# Patient Record
Sex: FEMALE | Employment: STUDENT | URBAN - METROPOLITAN AREA
[De-identification: names, ages, dates, MRNs, and addresses within clinical notes are randomized per-mention and may not be internally consistent; named-entity substitution may affect disease eponyms.]

---

## 2017-01-18 ENCOUNTER — TRANSFERRED RECORDS (OUTPATIENT)
Dept: HEALTH INFORMATION MANAGEMENT | Facility: CLINIC | Age: 22
End: 2017-01-18

## 2018-12-07 ENCOUNTER — OFFICE VISIT (OUTPATIENT)
Dept: DERMATOLOGY | Facility: CLINIC | Age: 23
End: 2018-12-07
Payer: COMMERCIAL

## 2018-12-07 DIAGNOSIS — D22.9 MULTIPLE BENIGN NEVI: ICD-10-CM

## 2018-12-07 DIAGNOSIS — R61 HYPERHIDROSIS: ICD-10-CM

## 2018-12-07 DIAGNOSIS — D48.9 NEOPLASM OF UNCERTAIN BEHAVIOR: Primary | ICD-10-CM

## 2018-12-07 ASSESSMENT — PAIN SCALES - GENERAL: PAINLEVEL: NO PAIN (0)

## 2018-12-07 NOTE — LETTER
Date:December 10, 2018      Patient was self referred, no letter generated. Do not send.        [facility] Health Information

## 2018-12-07 NOTE — LETTER
12/7/2018       RE: Michelle Tapia  [address] [place] [id number]     Dear Colleague,    Thank you for referring your patient, Michelle Tapia, to the [facility] DERMATOLOGY at [facility]. Please see a copy of my visit note below.    [facility] Dermatology Note      Dermatology Problem List:  1. Axillary hyperhidrosis.  - Previously failed topicals and pills (presumably glycopyrrolate) at outside clinic, then on Botox yearly since high school  - Botox referral once we check insurance  2. Lesion to monitor:  - L lower back, 9 mm medium brown nevus, photo 12/7/2018  # NUB:  - L knee, bx 12/7/2018     Encounter Date: Dec 7, 2018    CC:   Chief Complaint   Patient presents with     Derm Problem     michelle is here for Hyperhydrosis & mole check.         History of Present Illness:  Ms. Michelle Tapia is a 23 year old female who presents for evaluation of skin check and axillary hyperhidrosis.  Patient has a long-standing history of axillary hyperhidrosis.  She previously tried wipes and pills, presumably glycopyrrolate, through other clinics.  She has then been getting Botox since high school.  She usually needs to get it about once a year.  Her symptoms are worse in the winter and she would like to get this procedure soon. She last had it down a little over 1 year ago.    She also would like a general skin check.  She notes one lesion on her left knee which has recently been scabbing and bleeding.  It has been present since birth but the scabbing and bleeding is new.  She also notes a newer mole about 6 months ago on her back that is a little bigger than her other moles. It might be getting a little bigger but she is not sure.  She has a history of mole removal at the age of 2 and is not sure on the pathology. No other personal history of mole removal or skin cancer.  Otherwise feeling well, in usual state of health.  No additional skin concerns.   Does have a history of tanning bed use before prom in high school.    Past Medical History:     Past Medical History:   Diagnosis Date     Hyperhidrosis      Social History:  Patient reports that she has never smoked. She has never used smokeless tobacco.    at the [employer].  She is getting her masters degree in .  + history of tanning bed use in high school.    Family History:  Family History   Problem Relation Age of Onset     Skin Cancer No family hx of        Medications:  No current outpatient prescriptions on file.        Allergies   Allergen Reactions     Furoxone [Furazolidone] Hives         Review of Systems:  -Constitutional: Otherwise feeling well today, in usual state of health.  -Skin: As above in HPI. No additional skin concerns.    Physical exam:  GEN: This is a well developed, well-nourished female in no acute distress, in a pleasant mood.    SKIN: Total skin excluding the undergarment areas was performed. The exam included the head/face, neck, both arms, chest, back, abdomen, both legs, digits and/or nails.   - Lesion of concern on left knee is a 5 mm greyish blue macule with rim of hypopigmentation on lateral aspect, total size of 8 mm. Dermoscopy with asymmetric pigment.  - Lesion of concern on left lower back is a ovoid 9 mm medium brown macule with symmetric reticulated pigment.  -Scattered also on the trunk extremities, there are multiple light to medium brown macules with reassuring appearance.  Pigment network often reticulated.  - No other lesions of concern on areas examined.              Impression/Plan:  1. Neoplasm of uncertain behavior on the left knee. The differential diagnosis includes dysplastic nevus v traumatized nevus v melanoma. Will perform bx for clarification.     Shave biopsy:  After discussion of benefits and risks including but not limited to bleeding/bruising, pain/swelling, infection, scar, incomplete removal, nerve  damage/numbness, recurrence, and non-diagnostic biopsy, written consent, verbal consent and photographs were obtained. Time-out was performed. The area was cleaned with isopropyl alcohol and a total 2.5ml of 1% lidocaine was injected to obtain adequate anesthesia. A shave biopsy was performed. Hemostasis was achieved with aluminium chloride. Vaseline and a sterile dressing were applied. The patient tolerated the procedure and no complications were noted. The patient was provided with verbal and written post care instructions.    2. Multiple clinically benign nevi on the trunk and extremities.    ABCDs of melanoma were discussed and self skin checks were advised.    Sun precaution was advised including the use of sun screens of SPF 30 or higher, sun protective clothing, and avoidance of tanning beds,    3. Lesion to monitor, left lower back. Nevus on left lower back with reassuring appearance but given size and recent development, will take photo today and monitor for changes.     Photodocumentation today.    Patient will call if notes changes sooner.    4. Axillary hyperhidrosis.  Has previously failed topicals and pills, presumably glycopyrrolate, and has been on Botox for many years.  She is due for her next injection and would like to schedule this.  Last injection was over 1 year ago.    Check botox with insurance.    Schedule for botox.    Follow-up in for botox when able to be scheduled then in 1 year for skin check, earlier for new or changing lesions or pending biopsy results.      Staff Physician Comments:  I saw and evaluated the patient with the resident and I agree with the assessment and plan as documented in the resident's note.    Nikunj Arriaga MD  Professor  Head of Dermato-Allergy Division  Department of Dermatology  [facility], [place], [place]      Staff Involved:  Resident/Staff    Again, thank you for allowing me to participate in the care of your patient.       Sincerely,    Nikunj Arriaga MD

## 2018-12-07 NOTE — MR AVS SNAPSHOT
After Visit Summary   12/7/2018    Michelle Tapia    MRN: [id number]           Patient Information     Date Of Birth          1995        Visit Information        Provider Department      12/7/2018 10:00 AM Nikunj Arriaga MD [facility] Dermatology        Today's Diagnoses     Neoplasm of uncertain behavior    -  1    Multiple benign nevi        Hyperhidrosis          Care Instructions    We will check your mole today and let you know the results.  We will also run the botox through your insurance to make sure it's well-covered and then we will call to get you scheduled.    Return in 1 year for your skin check.  Sooner for the botox and if any skin changes.        Wound Care After a Biopsy    What is a skin biopsy?  A skin biopsy allows the doctor to examine a very small piece of tissue under the microscope to determine the diagnosis and the best treatment for the skin condition. A local anesthetic (numbing medicine)  is injected with a very small needle into the skin area to be tested. A small piece of skin is taken from the area. Sometimes a suture (stitch) is used.     What are the risks of a skin biopsy?  I will experience scar, bleeding, swelling, pain, crusting and redness. I may experience incomplete removal or recurrence. Risks of this procedure are excessive bleeding, bruising, infection, nerve damage, numbness, thick (hypertrophic or keloidal) scar and non-diagnostic biopsy.    How should I care for my wound for the first 24 hours?    Keep the wound dry and covered for 24 hours    If it bleeds, hold direct pressure on the area for 15 minutes. If bleeding does not stop then go to the emergency room    Avoid strenuous exercise the first 1-2 days or as your doctor instructs you    How should I care for the wound after 24 hours?    After 24 hours, remove the bandage    You may bathe or shower as normal    If you had a scalp biopsy, you can shampoo as usual and can use shower water to  clean the biopsy site daily    Clean the wound twice a day with gentle soap and water    Do not scrub, be gentle    Apply white petroleum/Vaseline after cleaning the wound with a cotton swab or a clean finger, and keep the site covered with a Bandaid /bandage. Bandages are not necessary with a scalp biopsy    If you are unable to cover the site with a Bandaid /bandage, re-apply ointment 2-3 times a day to keep the site moist. Moisture will help with healing    Avoid strenuous activity for first 1-2 days    Avoid lakes, rivers, pools, and oceans until the stitches are removed or the site is healed    How do I clean my wound?    Wash hands thoroughly with soap or use hand  before all wound care    Clean the wound with gentle soap and water    Apply white petroleum/Vaseline  to wound after it is clean    Replace the Bandaid /bandage to keep the wound covered for the first few days or as instructed by your doctor    If you had a scalp biopsy, warm shower water to the area on a daily basis should suffice    What should I use to clean my wound?     Cotton-tipped applicators (Qtips )    White petroleum jelly (Vaseline ). Use a clean new container and use Q-tips to apply.    Bandaids   as needed    Gentle soap     How should I care for my wound long term?    Do not get your wound dirty    Keep up with wound care for one week or until the area is healed.    A small scab will form and fall off by itself when the area is completely healed. The area will be red and will become pink in color as it heals. Sun protection is very important for how your scar will turn out. Sunscreen with an SPF 30 or greater is recommended once the area is healed.    If you have stitches, stitches need to be removed in 14 days. You may return to our clinic for this or you may have it done locally at your doctor s office.    You should have some soreness but it should be mild and slowly go away over several days. Talk to your doctor about  using tylenol for pain,    When should I call my doctor?  If you have increased:     Pain or swelling    Pus or drainage (clear or slightly yellow drainage is ok)    Temperature over 100F    Spreading redness or warmth around wound    When will I hear about my results?  The biopsy results can take 2-3 weeks to come back. The clinic will call you with the results, send you a [employer] message, or have you schedule a follow-up clinic or phone time to discuss the results. Contact our clinics if you do not hear from us in 3 weeks.     Who should I call with questions?    [facility]: [phone]     [facility]: [phone]    For urgent needs outside of business hours call the [facility] at [phone] and ask for the dermatology resident on call    The ABCDEs of Melanoma    Skin cancer can develop anywhere on the skin. Ask someone for help when checking your skin, especially in hard to see places. If you notice a mole different from others, or that changes, enlarges, itches, or bleeds (even if it is small), you should see a dermatologist.                  Follow-ups after your visit        Follow-up notes from your care team     Return in about 1 year (around 12/7/2019).      Who to contact     Please call your clinic at [phone] to:    Ask questions about your health    Make or cancel appointments    Discuss your medicines    Learn about your test results    Speak to your doctor            Additional Information About Your Visit        MyChart Information     easy2comply ([employer]) is an electronic gateway that provides easy, online access to your medical records. With easy[employer]comply ([employer]), you can request a clinic appointment, read your test results, renew a prescription or communicate with your care team.     To sign up for [employer] visit the website at [email or web address]   You will be asked to enter the access code listed below, as well as some personal  information. Please follow the directions to create your username and password.     Your access code is: [email or web address]  Expires: 3/6/2019  6:30 AM     Your access code will  in 90 days. If you need help or a new code, please contact your [facility] or call [phone] for assistance.        Care EveryWhere ID     This is your Care EveryWhere ID. This could be used by other organizations to access your [place] medical records  [id number]         Blood Pressure from Last 3 Encounters:   No data found for BP    Weight from Last 3 Encounters:   No data found for Wt              We Performed the Following     Dermatological path order and indications        Primary Care Provider    None Specified       No primary provider on file.        Equal Access to Services     [facility] : Andrea Faith, joel archer, eric heck, edgar martin . So [facility] [phone].    ATENCIÓN: Si habla español, tiene a george disposición servicios gratuitos de asistencia lingüística. Llame al [phone].    We comply with applicable federal civil rights laws and [place] laws. We do not discriminate on the basis of race, color, national origin, age, disability, sex, sexual orientation, or gender identity.            Thank you!     Thank you for choosing [facility] DERMATOLOGY  for your care. Our goal is always to provide you with excellent care. Hearing back from our patients is one way we can continue to improve our services. Please take a few minutes to complete the written survey that you may receive in the mail after your visit with us. Thank you!             Your Updated Medication List - Protect others around you: Learn how to safely use, store and throw away your medicines at www.disposemymeds.org.      Notice  As of 2018 10:52 AM    You have not been prescribed any medications.

## 2018-12-07 NOTE — PATIENT INSTRUCTIONS
We will check your mole today and let you know the results.  We will also run the botox through your insurance to make sure it's well-covered and then we will call to get you scheduled.    Return in 1 year for your skin check.  Sooner for the botox and if any skin changes.        Wound Care After a Biopsy    What is a skin biopsy?  A skin biopsy allows the doctor to examine a very small piece of tissue under the microscope to determine the diagnosis and the best treatment for the skin condition. A local anesthetic (numbing medicine)  is injected with a very small needle into the skin area to be tested. A small piece of skin is taken from the area. Sometimes a suture (stitch) is used.     What are the risks of a skin biopsy?  I will experience scar, bleeding, swelling, pain, crusting and redness. I may experience incomplete removal or recurrence. Risks of this procedure are excessive bleeding, bruising, infection, nerve damage, numbness, thick (hypertrophic or keloidal) scar and non-diagnostic biopsy.    How should I care for my wound for the first 24 hours?    Keep the wound dry and covered for 24 hours    If it bleeds, hold direct pressure on the area for 15 minutes. If bleeding does not stop then go to the emergency room    Avoid strenuous exercise the first 1-2 days or as your doctor instructs you    How should I care for the wound after 24 hours?    After 24 hours, remove the bandage    You may bathe or shower as normal    If you had a scalp biopsy, you can shampoo as usual and can use shower water to clean the biopsy site daily    Clean the wound twice a day with gentle soap and water    Do not scrub, be gentle    Apply white petroleum/Vaseline after cleaning the wound with a cotton swab or a clean finger, and keep the site covered with a Bandaid /bandage. Bandages are not necessary with a scalp biopsy    If you are unable to cover the site with a Bandaid /bandage, re-apply ointment 2-3 times a day to keep the  site moist. Moisture will help with healing    Avoid strenuous activity for first 1-2 days    Avoid lakes, rivers, pools, and oceans until the stitches are removed or the site is healed    How do I clean my wound?    Wash hands thoroughly with soap or use hand  before all wound care    Clean the wound with gentle soap and water    Apply white petroleum/Vaseline  to wound after it is clean    Replace the Bandaid /bandage to keep the wound covered for the first few days or as instructed by your doctor    If you had a scalp biopsy, warm shower water to the area on a daily basis should suffice    What should I use to clean my wound?     Cotton-tipped applicators (Qtips )    White petroleum jelly (Vaseline ). Use a clean new container and use Q-tips to apply.    Bandaids   as needed    Gentle soap     How should I care for my wound long term?    Do not get your wound dirty    Keep up with wound care for one week or until the area is healed.    A small scab will form and fall off by itself when the area is completely healed. The area will be red and will become pink in color as it heals. Sun protection is very important for how your scar will turn out. Sunscreen with an SPF 30 or greater is recommended once the area is healed.    If you have stitches, stitches need to be removed in 14 days. You may return to our clinic for this or you may have it done locally at your doctor s office.    You should have some soreness but it should be mild and slowly go away over several days. Talk to your doctor about using tylenol for pain,    When should I call my doctor?  If you have increased:     Pain or swelling    Pus or drainage (clear or slightly yellow drainage is ok)    Temperature over 100F    Spreading redness or warmth around wound    When will I hear about my results?  The biopsy results can take 2-3 weeks to come back. The clinic will call you with the results, send you a [employer] message, or have you schedule a  follow-up clinic or phone time to discuss the results. Contact our clinics if you do not hear from us in 3 weeks.     Who should I call with questions?    [facility]: [phone]     [facility]: [phone]    For urgent needs outside of business hours call the [facility] at [phone] and ask for the dermatology resident on call    The ABCDEs of Melanoma    Skin cancer can develop anywhere on the skin. Ask someone for help when checking your skin, especially in hard to see places. If you notice a mole different from others, or that changes, enlarges, itches, or bleeds (even if it is small), you should see a dermatologist.

## 2018-12-07 NOTE — PROGRESS NOTES
[facility] Dermatology Note      Dermatology Problem List:  1. Axillary hyperhidrosis.  - Previously failed topicals and pills (presumably glycopyrrolate) at outside clinic, then on Botox yearly since high school  - Botox referral once we check insurance  2. Lesion to monitor:  - L lower back, 9 mm medium brown nevus, photo 12/7/2018  # NUB:  - L knee, bx 12/7/2018     Encounter Date: Dec 7, 2018    CC:   Chief Complaint   Patient presents with     Derm Problem     michelle is here for Hyperhydrosis & mole check.         History of Present Illness:  Ms. Michelle Tapia is a 23 year old female who presents for evaluation of skin check and axillary hyperhidrosis.  Patient has a long-standing history of axillary hyperhidrosis.  She previously tried wipes and pills, presumably glycopyrrolate, through other clinics.  She has then been getting Botox since high school.  She usually needs to get it about once a year.  Her symptoms are worse in the winter and she would like to get this procedure soon. She last had it down a little over 1 year ago.    She also would like a general skin check.  She notes one lesion on her left knee which has recently been scabbing and bleeding.  It has been present since birth but the scabbing and bleeding is new.  She also notes a newer mole about 6 months ago on her back that is a little bigger than her other moles. It might be getting a little bigger but she is not sure.  She has a history of mole removal at the age of 2 and is not sure on the pathology. No other personal history of mole removal or skin cancer.  Otherwise feeling well, in usual state of health.  No additional skin concerns.  Does have a history of tanning bed use before prom in high school.    Past Medical History:     Past Medical History:   Diagnosis Date     Hyperhidrosis      Social History:  Patient reports that she has never smoked. She has never used smokeless tobacco.    at  the [employer].  She is getting her masters degree in .  + history of tanning bed use in high school.    Family History:  Family History   Problem Relation Age of Onset     Skin Cancer No family hx of        Medications:  No current outpatient prescriptions on file.        Allergies   Allergen Reactions     Furoxone [Furazolidone] Hives         Review of Systems:  -Constitutional: Otherwise feeling well today, in usual state of health.  -Skin: As above in HPI. No additional skin concerns.    Physical exam:  GEN: This is a well developed, well-nourished female in no acute distress, in a pleasant mood.    SKIN: Total skin excluding the undergarment areas was performed. The exam included the head/face, neck, both arms, chest, back, abdomen, both legs, digits and/or nails.   - Lesion of concern on left knee is a 5 mm greyish blue macule with rim of hypopigmentation on lateral aspect, total size of 8 mm. Dermoscopy with asymmetric pigment.  - Lesion of concern on left lower back is a ovoid 9 mm medium brown macule with symmetric reticulated pigment.  -Scattered also on the trunk extremities, there are multiple light to medium brown macules with reassuring appearance.  Pigment network often reticulated.  - No other lesions of concern on areas examined.              Impression/Plan:  1. Neoplasm of uncertain behavior on the left knee. The differential diagnosis includes dysplastic nevus v traumatized nevus v melanoma. Will perform bx for clarification.     Shave biopsy:  After discussion of benefits and risks including but not limited to bleeding/bruising, pain/swelling, infection, scar, incomplete removal, nerve damage/numbness, recurrence, and non-diagnostic biopsy, written consent, verbal consent and photographs were obtained. Time-out was performed. The area was cleaned with isopropyl alcohol and a total 2.5ml of 1% lidocaine was injected to obtain adequate anesthesia. A shave biopsy  was performed. Hemostasis was achieved with aluminium chloride. Vaseline and a sterile dressing were applied. The patient tolerated the procedure and no complications were noted. The patient was provided with verbal and written post care instructions.    Specimen #: [id number]    Skin, left knee     FINAL DIAGNOSIS: Skin, left knee: - Intradermal melanocytic nevus - (see description)     ==> no further actions necessary    2. Multiple clinically benign nevi on the trunk and extremities.    ABCDs of melanoma were discussed and self skin checks were advised.    Sun precaution was advised including the use of sun screens of SPF 30 or higher, sun protective clothing, and avoidance of tanning beds,    3. Lesion to monitor, left lower back. Nevus on left lower back with reassuring appearance but given size and recent development, will take photo today and monitor for changes.     Photodocumentation today.    Patient will call if notes changes sooner.    4. Axillary hyperhidrosis.  Has previously failed topicals and pills, presumably glycopyrrolate, and has been on Botox for many years.  She is due for her next injection and would like to schedule this.  Last injection was over 1 year ago.    Check botox with insurance.    Schedule for botox.    Follow-up in for botox when able to be scheduled then in 1 year for skin check, earlier for new or changing lesions or pending biopsy results.      Staff Physician Comments:  I saw and evaluated the patient with the resident and I agree with the assessment and plan as documented in the resident's note.    Nikunj Arriaga MD  Professor  Head of Dermato-Allergy Division  Department of Dermatology  [facility]      Staff Involved:  Resident/Staff

## 2018-12-07 NOTE — NURSING NOTE
Dermatology Rooming Note    Michelle Tapia's goals for this visit include:   Chief Complaint   Patient presents with     Derm Problem     michelle is here for Hyperhydrosis & mole check.     Meghan Del Cid, CMA

## 2018-12-11 LAB — COPATH REPORT: NORMAL
